# Patient Record
Sex: MALE | Race: WHITE | NOT HISPANIC OR LATINO | URBAN - METROPOLITAN AREA
[De-identification: names, ages, dates, MRNs, and addresses within clinical notes are randomized per-mention and may not be internally consistent; named-entity substitution may affect disease eponyms.]

---

## 2023-12-15 ENCOUNTER — PHARMACY VISIT (OUTPATIENT)
Dept: PHARMACY | Facility: CLINIC | Age: 63
End: 2023-12-15
Payer: COMMERCIAL

## 2023-12-15 PROCEDURE — RXMED WILLOW AMBULATORY MEDICATION CHARGE

## 2024-02-02 ENCOUNTER — TELEPHONE (OUTPATIENT)
Dept: PRIMARY CARE | Facility: CLINIC | Age: 64
End: 2024-02-02
Payer: COMMERCIAL

## 2024-02-02 DIAGNOSIS — Z12.5 SPECIAL SCREENING FOR MALIGNANT NEOPLASM OF PROSTATE: ICD-10-CM

## 2024-02-02 DIAGNOSIS — E03.9 HYPOTHYROIDISM, UNSPECIFIED TYPE: ICD-10-CM

## 2024-02-02 DIAGNOSIS — Z00.00 ROUTINE GENERAL MEDICAL EXAMINATION AT A HEALTH CARE FACILITY: ICD-10-CM

## 2024-02-05 PROBLEM — R09.81 NASAL CONGESTION: Status: RESOLVED | Noted: 2022-09-09 | Resolved: 2024-02-05

## 2024-02-05 PROBLEM — N52.9 ERECTILE DYSFUNCTION: Status: ACTIVE | Noted: 2023-04-12

## 2024-02-05 PROBLEM — R09.81 SINUS CONGESTION: Status: RESOLVED | Noted: 2023-03-08 | Resolved: 2024-02-05

## 2024-02-05 PROBLEM — R05.1 ACUTE COUGH: Status: RESOLVED | Noted: 2021-12-22 | Resolved: 2024-02-05

## 2024-02-05 PROBLEM — M25.561 KNEE PAIN, RIGHT: Status: RESOLVED | Noted: 2022-01-11 | Resolved: 2024-02-05

## 2024-02-05 PROBLEM — G47.30 SLEEP APNEA: Status: ACTIVE | Noted: 2019-03-22

## 2024-03-12 DIAGNOSIS — E03.9 HYPOTHYROIDISM, UNSPECIFIED TYPE: Primary | ICD-10-CM

## 2024-03-12 DIAGNOSIS — N52.9 VASCULOGENIC ERECTILE DYSFUNCTION, UNSPECIFIED VASCULOGENIC ERECTILE DYSFUNCTION TYPE: ICD-10-CM

## 2024-03-12 PROCEDURE — RXMED WILLOW AMBULATORY MEDICATION CHARGE

## 2024-03-12 RX ORDER — LEVOTHYROXINE SODIUM 75 UG/1
75 TABLET ORAL DAILY
Qty: 90 TABLET | Refills: 3 | Status: SHIPPED | OUTPATIENT
Start: 2024-03-12 | End: 2025-03-12

## 2024-03-12 RX ORDER — SILDENAFIL 100 MG/1
TABLET, FILM COATED ORAL
Qty: 30 TABLET | Refills: 5 | Status: SHIPPED | OUTPATIENT
Start: 2024-03-12 | End: 2025-03-12

## 2024-03-13 ENCOUNTER — PHARMACY VISIT (OUTPATIENT)
Dept: PHARMACY | Facility: CLINIC | Age: 64
End: 2024-03-13
Payer: COMMERCIAL

## 2024-03-13 PROCEDURE — RXMED WILLOW AMBULATORY MEDICATION CHARGE

## 2024-04-12 ENCOUNTER — LAB (OUTPATIENT)
Dept: LAB | Facility: LAB | Age: 64
End: 2024-04-12
Payer: COMMERCIAL

## 2024-04-12 DIAGNOSIS — Z12.5 SPECIAL SCREENING FOR MALIGNANT NEOPLASM OF PROSTATE: ICD-10-CM

## 2024-04-12 DIAGNOSIS — E03.9 HYPOTHYROIDISM, UNSPECIFIED TYPE: ICD-10-CM

## 2024-04-12 DIAGNOSIS — Z00.00 ROUTINE GENERAL MEDICAL EXAMINATION AT A HEALTH CARE FACILITY: ICD-10-CM

## 2024-04-12 LAB
ALBUMIN SERPL-MCNC: 4.5 G/DL (ref 3.5–5)
ALP BLD-CCNC: 56 U/L (ref 35–125)
ALT SERPL-CCNC: 11 U/L (ref 5–40)
ANION GAP SERPL CALC-SCNC: 10 MMOL/L
APPEARANCE UR: CLEAR
AST SERPL-CCNC: 21 U/L (ref 5–40)
BASOPHILS # BLD AUTO: 0.03 X10*3/UL (ref 0–0.1)
BASOPHILS NFR BLD AUTO: 0.6 %
BILIRUB SERPL-MCNC: 0.9 MG/DL (ref 0.1–1.2)
BILIRUB UR STRIP.AUTO-MCNC: NEGATIVE MG/DL
BUN SERPL-MCNC: 19 MG/DL (ref 8–25)
CALCIUM SERPL-MCNC: 9.5 MG/DL (ref 8.5–10.4)
CHLORIDE SERPL-SCNC: 102 MMOL/L (ref 97–107)
CHOLEST SERPL-MCNC: 242 MG/DL (ref 133–200)
CHOLEST/HDLC SERPL: 2.7 {RATIO}
CO2 SERPL-SCNC: 29 MMOL/L (ref 24–31)
COLOR UR: COLORLESS
CREAT SERPL-MCNC: 1.1 MG/DL (ref 0.4–1.6)
EGFRCR SERPLBLD CKD-EPI 2021: 75 ML/MIN/1.73M*2
EOSINOPHIL # BLD AUTO: 0.1 X10*3/UL (ref 0–0.7)
EOSINOPHIL NFR BLD AUTO: 1.9 %
ERYTHROCYTE [DISTWIDTH] IN BLOOD BY AUTOMATED COUNT: 12.6 % (ref 11.5–14.5)
EST. AVERAGE GLUCOSE BLD GHB EST-MCNC: 108 MG/DL
GLUCOSE SERPL-MCNC: 86 MG/DL (ref 65–99)
GLUCOSE UR STRIP.AUTO-MCNC: NORMAL MG/DL
HBA1C MFR BLD: 5.4 %
HCT VFR BLD AUTO: 44.9 % (ref 41–52)
HDLC SERPL-MCNC: 90 MG/DL
HGB BLD-MCNC: 14.7 G/DL (ref 13.5–17.5)
IMM GRANULOCYTES # BLD AUTO: 0.02 X10*3/UL (ref 0–0.7)
IMM GRANULOCYTES NFR BLD AUTO: 0.4 % (ref 0–0.9)
KETONES UR STRIP.AUTO-MCNC: NEGATIVE MG/DL
LDLC SERPL CALC-MCNC: 139 MG/DL (ref 65–130)
LEUKOCYTE ESTERASE UR QL STRIP.AUTO: NEGATIVE
LYMPHOCYTES # BLD AUTO: 1.29 X10*3/UL (ref 1.2–4.8)
LYMPHOCYTES NFR BLD AUTO: 24.4 %
MCH RBC QN AUTO: 29.6 PG (ref 26–34)
MCHC RBC AUTO-ENTMCNC: 32.7 G/DL (ref 32–36)
MCV RBC AUTO: 91 FL (ref 80–100)
MONOCYTES # BLD AUTO: 0.45 X10*3/UL (ref 0.1–1)
MONOCYTES NFR BLD AUTO: 8.5 %
NEUTROPHILS # BLD AUTO: 3.4 X10*3/UL (ref 1.2–7.7)
NEUTROPHILS NFR BLD AUTO: 64.2 %
NITRITE UR QL STRIP.AUTO: NEGATIVE
NRBC BLD-RTO: 0 /100 WBCS (ref 0–0)
PH UR STRIP.AUTO: 6.5 [PH]
PLATELET # BLD AUTO: 213 X10*3/UL (ref 150–450)
POTASSIUM SERPL-SCNC: 3.7 MMOL/L (ref 3.4–5.1)
PROT SERPL-MCNC: 6.5 G/DL (ref 5.9–7.9)
PROT UR STRIP.AUTO-MCNC: NEGATIVE MG/DL
PSA SERPL-MCNC: 0.6 NG/ML
RBC # BLD AUTO: 4.96 X10*6/UL (ref 4.5–5.9)
RBC # UR STRIP.AUTO: NEGATIVE /UL
SODIUM SERPL-SCNC: 141 MMOL/L (ref 133–145)
SP GR UR STRIP.AUTO: 1
T4 FREE SERPL-MCNC: 1.4 NG/DL (ref 0.9–1.7)
TRIGL SERPL-MCNC: 67 MG/DL (ref 40–150)
TSH SERPL DL<=0.05 MIU/L-ACNC: 7.1 MIU/L (ref 0.27–4.2)
UROBILINOGEN UR STRIP.AUTO-MCNC: NORMAL MG/DL
WBC # BLD AUTO: 5.3 X10*3/UL (ref 4.4–11.3)

## 2024-04-12 PROCEDURE — 83036 HEMOGLOBIN GLYCOSYLATED A1C: CPT

## 2024-04-12 PROCEDURE — 36415 COLL VENOUS BLD VENIPUNCTURE: CPT

## 2024-04-12 PROCEDURE — 84443 ASSAY THYROID STIM HORMONE: CPT

## 2024-04-12 PROCEDURE — 84153 ASSAY OF PSA TOTAL: CPT

## 2024-04-12 PROCEDURE — 84439 ASSAY OF FREE THYROXINE: CPT

## 2024-04-12 PROCEDURE — 80053 COMPREHEN METABOLIC PANEL: CPT

## 2024-04-12 PROCEDURE — 81003 URINALYSIS AUTO W/O SCOPE: CPT

## 2024-04-12 PROCEDURE — 80061 LIPID PANEL: CPT

## 2024-04-12 PROCEDURE — 85025 COMPLETE CBC W/AUTO DIFF WBC: CPT

## 2024-04-17 ENCOUNTER — OFFICE VISIT (OUTPATIENT)
Dept: PRIMARY CARE | Facility: CLINIC | Age: 64
End: 2024-04-17
Payer: COMMERCIAL

## 2024-04-17 VITALS
HEIGHT: 71 IN | OXYGEN SATURATION: 99 % | WEIGHT: 177 LBS | HEART RATE: 72 BPM | DIASTOLIC BLOOD PRESSURE: 78 MMHG | BODY MASS INDEX: 24.78 KG/M2 | SYSTOLIC BLOOD PRESSURE: 124 MMHG

## 2024-04-17 DIAGNOSIS — Z23 IMMUNIZATION DUE: Primary | ICD-10-CM

## 2024-04-17 DIAGNOSIS — E03.9 HYPOTHYROIDISM, UNSPECIFIED TYPE: ICD-10-CM

## 2024-04-17 DIAGNOSIS — Z00.00 ROUTINE GENERAL MEDICAL EXAMINATION AT A HEALTH CARE FACILITY: ICD-10-CM

## 2024-04-17 PROCEDURE — 99396 PREV VISIT EST AGE 40-64: CPT | Performed by: FAMILY MEDICINE

## 2024-04-17 PROCEDURE — 1036F TOBACCO NON-USER: CPT | Performed by: FAMILY MEDICINE

## 2024-04-17 PROCEDURE — 90715 TDAP VACCINE 7 YRS/> IM: CPT | Performed by: FAMILY MEDICINE

## 2024-04-17 PROCEDURE — 90471 IMMUNIZATION ADMIN: CPT | Performed by: FAMILY MEDICINE

## 2024-04-17 ASSESSMENT — PATIENT HEALTH QUESTIONNAIRE - PHQ9
1. LITTLE INTEREST OR PLEASURE IN DOING THINGS: NOT AT ALL
2. FEELING DOWN, DEPRESSED OR HOPELESS: NOT AT ALL
SUM OF ALL RESPONSES TO PHQ9 QUESTIONS 1 AND 2: 0

## 2024-04-17 ASSESSMENT — PAIN SCALES - GENERAL: PAINLEVEL: 0-NO PAIN

## 2024-04-17 NOTE — PROGRESS NOTES
"Subjective   Patient ID: Jose Elias Conner is a 63 y.o. male who presents for Annual Exam (EKG 4/12/2023/Colonoscopy  3/2019).    HPI   Here for CPE.  He is followed for hypothyroidism.  He is on levothyroxine 75 micrograms daily.  Sees Dr. JOSE Ross for rosacea.  He had been using metronidazole cream , however it has gone into remission and he has not needed medication for awhile.   He has erectile dysfunction .  He he is using sildenafil as needed.  Recent lab work finds an LDL mildly elevated above 130.  Review of the chart finds that patient has had this currently him in the past about 5 years ago.  It is not concerning at this time.  Patient had a tetanus shot in 2014.  He had his shingles immunizations in 2022.  He has not been immunized against pneumococcal disease yet.  He has been immunized against coronavirus x3.  He did receive his influenza immunization in the fall of 2023.   Patient had a colonoscopy in 2019 with a 10 year follow-up.   Patient has never used tobacco.  He has 1 glass of wine daily.  Review of Systems  General:  No fever chills weight loss or night sweats.  HEENT:  No sore throat no difficulty swallowing no earache no sinus pain no nasal drainage.  Pulmonary:  No cough sputum production shortness of breath or dyspnea on exertion.  Cardiac:  No chest pain palpitations shortness of breath syncope.  Gastrointestinal:  No abdominal bloating or abdominal pain.  No nausea vomiting.  No indigestion heartburn.  No melena or hematochezia.  No diarrhea.  Genitourinary no dysuria or hematuria.  Neuro:  No tingling numbness of the upper lower extremities.  No weakness of the upper lower extremities.  No headache.  Vision:  No blurred vision double vision or loss of vision.   Musculoskeletal:  No myalgia or arthralgia.  No joint swelling redness or warmth.  Psychiatric:  No moodiness depression or severe anxiety.  Objective   /78   Pulse 72   Ht 1.803 m (5' 11\")   Wt 80.3 kg (177 lb)   SpO2 " 99%   BMI 24.69 kg/m²     Physical Exam  General Appearance: NORMAN is in no acute distress. He is well nourished, well developed. The patient is awake and alert and appears stated age.  Head: Jacoby's hair pattern is normal for patients age and The scalp is normal . The skull is normocephalic, atraumatic. The face is unremarkable with no facial droop.  Eyes: PERRLA, EOMI, no scleral icterus.  Normal position and alignment. Eyebrows are normal. Both upper and lower lids have normal findings.  Ears, Nose, Mouth, Throat: EARS: External bilateral ears reveal normal helix, tragus and ear lobe.  Both canals are normal.  Tympanic membranes are pearly gray, normal landmarks, good light reflex.   MOUTH:   No oral cavity or dental complaints today.  He is wearing a mask.  Oral cavity is not inspected  Neck: Inspection reveals normal thyroid gland. Palpation shows normal thyroid gland. with No carotid bruit present.   Anterior cervical lymph node chains are unremarkable. Posterior chains are unremarkable.  Chest: Chest is symmetric. Lungs are clear to auscultation, no respiratory distress. Breathing is normal.  Cardiovascular: Heart is regular rate and rhythm DP pulses are present.  varicosities of both legs below the knees.  Abdomen: Abdomen is soft, NT, ND. BS + . No organomegaly or masses..   Genitourinary: Genital exam: .   Deferred  Lymph Nodes: Palpation of the cervical area are within normal limit.  Musculoskeletal: Gait is normal. Extremities: Full Range of motion and strength throughout. Cervical exam shows full range of motion and no tenderness .  Both feet have no evidence of lesions and nails are in good repair. Motor examination reveals normal tone and strength.  Skin: Skin has no bruising, rash, eruptions, or abnormal appearing lesions.  Neurological: Intact and non-focal. Cranial nerves II - XII are grossly intact. Motor exams reveals normal tone and strength ,  Psychiatric: Patient has appropriate judgement.  Patient has good insight. Proper orientation to person, place and time. Recent memory is intact. Remote memory is intact. Patient's mood is normal with good eye contact. Affect is appropriate.  Assessment/Plan   Problem List Items Addressed This Visit             ICD-10-CM    Hypothyroidism, unspecified   monitor.  Although TSH is mildly elevated at 7.3, free thyroxine is in the normal range.  Continue on levothyroxine 75 mcg daily. E03.9     Other Visit Diagnoses         Codes    Immunization due    -  Primary needs better control.  Patient received Tdap today.  Will consider Prevnar 20 next year. Z23    Relevant Orders    Tdap vaccine, age 7 years and older  (BOOSTRIX)    Routine general medical examination at a health care facility    normal exam.   Z00.00

## 2024-06-20 PROCEDURE — RXMED WILLOW AMBULATORY MEDICATION CHARGE

## 2024-06-21 ENCOUNTER — PHARMACY VISIT (OUTPATIENT)
Dept: PHARMACY | Facility: CLINIC | Age: 64
End: 2024-06-21
Payer: COMMERCIAL

## 2024-06-21 PROCEDURE — RXMED WILLOW AMBULATORY MEDICATION CHARGE

## 2024-07-31 ENCOUNTER — PHARMACY VISIT (OUTPATIENT)
Dept: PHARMACY | Facility: CLINIC | Age: 64
End: 2024-07-31
Payer: COMMERCIAL

## 2024-07-31 PROCEDURE — RXMED WILLOW AMBULATORY MEDICATION CHARGE

## 2024-07-31 RX ORDER — METRONIDAZOLE 7.5 MG/G
CREAM TOPICAL
Qty: 45 G | Refills: 0 | OUTPATIENT
Start: 2024-07-31

## 2024-07-31 RX ORDER — DOXYCYCLINE HYCLATE 50 MG/1
50 CAPSULE ORAL 2 TIMES DAILY
Qty: 84 CAPSULE | Refills: 0 | OUTPATIENT
Start: 2024-07-31

## 2024-08-02 ENCOUNTER — PHARMACY VISIT (OUTPATIENT)
Dept: PHARMACY | Facility: CLINIC | Age: 64
End: 2024-08-02
Payer: COMMERCIAL

## 2024-09-16 ENCOUNTER — CLINICAL SUPPORT (OUTPATIENT)
Dept: PRIMARY CARE | Facility: CLINIC | Age: 64
End: 2024-09-16
Payer: COMMERCIAL

## 2024-09-16 DIAGNOSIS — R09.81 NASAL CONGESTION: ICD-10-CM

## 2024-09-16 LAB
POC BINAX EXPIRATION: ABNORMAL
POC BINAX NOW COVID SERIAL NUMBER: ABNORMAL
POC SARS-COV-2 AG BINAX: ABNORMAL

## 2024-09-16 PROCEDURE — 87811 SARS-COV-2 COVID19 W/OPTIC: CPT | Performed by: FAMILY MEDICINE

## 2024-09-30 PROCEDURE — RXMED WILLOW AMBULATORY MEDICATION CHARGE

## 2024-10-02 ENCOUNTER — PHARMACY VISIT (OUTPATIENT)
Dept: PHARMACY | Facility: CLINIC | Age: 64
End: 2024-10-02
Payer: COMMERCIAL

## 2024-10-02 PROCEDURE — RXMED WILLOW AMBULATORY MEDICATION CHARGE

## 2024-12-27 ENCOUNTER — PHARMACY VISIT (OUTPATIENT)
Dept: PHARMACY | Facility: CLINIC | Age: 64
End: 2024-12-27
Payer: COMMERCIAL

## 2024-12-27 PROCEDURE — RXMED WILLOW AMBULATORY MEDICATION CHARGE

## 2025-01-06 ENCOUNTER — TELEPHONE (OUTPATIENT)
Dept: PRIMARY CARE | Facility: CLINIC | Age: 65
End: 2025-01-06
Payer: COMMERCIAL

## 2025-01-06 DIAGNOSIS — Z00.00 ROUTINE GENERAL MEDICAL EXAMINATION AT A HEALTH CARE FACILITY: ICD-10-CM

## 2025-01-06 DIAGNOSIS — Z12.5 SPECIAL SCREENING FOR MALIGNANT NEOPLASM OF PROSTATE: ICD-10-CM

## 2025-01-06 DIAGNOSIS — E03.9 HYPOTHYROIDISM, UNSPECIFIED TYPE: ICD-10-CM

## 2025-01-31 DIAGNOSIS — L01.00 IMPETIGO: Primary | ICD-10-CM

## 2025-01-31 DIAGNOSIS — M25.551 RIGHT HIP PAIN: ICD-10-CM

## 2025-01-31 PROCEDURE — RXMED WILLOW AMBULATORY MEDICATION CHARGE

## 2025-01-31 RX ORDER — MUPIROCIN 20 MG/G
OINTMENT TOPICAL 3 TIMES DAILY
Qty: 22 G | Refills: 1 | Status: SHIPPED | OUTPATIENT
Start: 2025-01-31 | End: 2025-02-11

## 2025-02-01 ENCOUNTER — PHARMACY VISIT (OUTPATIENT)
Dept: PHARMACY | Facility: CLINIC | Age: 65
End: 2025-02-01
Payer: COMMERCIAL

## 2025-02-07 ENCOUNTER — HOSPITAL ENCOUNTER (OUTPATIENT)
Dept: RADIOLOGY | Facility: CLINIC | Age: 65
Discharge: HOME | End: 2025-02-07
Payer: COMMERCIAL

## 2025-02-07 DIAGNOSIS — M25.551 RIGHT HIP PAIN: ICD-10-CM

## 2025-02-07 PROCEDURE — 73502 X-RAY EXAM HIP UNI 2-3 VIEWS: CPT | Mod: RT

## 2025-02-19 DIAGNOSIS — M25.551 RIGHT HIP PAIN: Primary | ICD-10-CM

## 2025-03-25 ENCOUNTER — PHARMACY VISIT (OUTPATIENT)
Dept: PHARMACY | Facility: CLINIC | Age: 65
End: 2025-03-25
Payer: COMMERCIAL

## 2025-03-25 DIAGNOSIS — M25.50 ARTHRALGIA, UNSPECIFIED JOINT: Primary | ICD-10-CM

## 2025-03-25 LAB
ALBUMIN SERPL-MCNC: 4 G/DL (ref 3.6–5.1)
ALP SERPL-CCNC: 59 U/L (ref 35–144)
ALT SERPL-CCNC: 11 U/L (ref 9–46)
ANION GAP SERPL CALCULATED.4IONS-SCNC: 7 MMOL/L (CALC) (ref 7–17)
AST SERPL-CCNC: 20 U/L (ref 10–35)
BILIRUB SERPL-MCNC: 0.7 MG/DL (ref 0.2–1.2)
BUN SERPL-MCNC: 12 MG/DL (ref 7–25)
CALCIUM SERPL-MCNC: 9.2 MG/DL (ref 8.6–10.3)
CHLORIDE SERPL-SCNC: 102 MMOL/L (ref 98–110)
CHOLEST SERPL-MCNC: 187 MG/DL
CHOLEST/HDLC SERPL: 2.4 (CALC)
CO2 SERPL-SCNC: 31 MMOL/L (ref 20–32)
CREAT SERPL-MCNC: 1 MG/DL (ref 0.7–1.35)
EGFRCR SERPLBLD CKD-EPI 2021: 84 ML/MIN/1.73M2
GLUCOSE SERPL-MCNC: 100 MG/DL (ref 65–99)
HDLC SERPL-MCNC: 79 MG/DL
LDLC SERPL CALC-MCNC: 93 MG/DL (CALC)
NONHDLC SERPL-MCNC: 108 MG/DL (CALC)
POTASSIUM SERPL-SCNC: 4.5 MMOL/L (ref 3.5–5.3)
PROT SERPL-MCNC: 6.9 G/DL (ref 6.1–8.1)
PSA SERPL-MCNC: 0.72 NG/ML
SODIUM SERPL-SCNC: 140 MMOL/L (ref 135–146)
T4 FREE SERPL-MCNC: 1.4 NG/DL (ref 0.8–1.8)
TRIGL SERPL-MCNC: 63 MG/DL
TSH SERPL-ACNC: 5.35 MIU/L (ref 0.4–4.5)

## 2025-03-25 PROCEDURE — RXMED WILLOW AMBULATORY MEDICATION CHARGE

## 2025-03-25 RX ORDER — PREDNISONE 20 MG/1
20 TABLET ORAL DAILY
Qty: 10 TABLET | Refills: 0 | Status: SHIPPED | OUTPATIENT
Start: 2025-03-25 | End: 2025-04-04

## 2025-03-26 LAB
BASOPHILS # BLD AUTO: 47 CELLS/UL (ref 0–200)
BASOPHILS NFR BLD AUTO: 0.6 %
EOSINOPHIL # BLD AUTO: 133 CELLS/UL (ref 15–500)
EOSINOPHIL NFR BLD AUTO: 1.7 %
ERYTHROCYTE [DISTWIDTH] IN BLOOD BY AUTOMATED COUNT: 12 % (ref 11–15)
ERYTHROCYTE [SEDIMENTATION RATE] IN BLOOD BY WESTERGREN METHOD: 11 MM/H
EST. AVERAGE GLUCOSE BLD GHB EST-MCNC: 114 MG/DL
EST. AVERAGE GLUCOSE BLD GHB EST-SCNC: 6.3 MMOL/L
HBA1C MFR BLD: 5.6 % OF TOTAL HGB
HCT VFR BLD AUTO: 41.1 % (ref 38.5–50)
HGB BLD-MCNC: 13.8 G/DL (ref 13.2–17.1)
LYMPHOCYTES # BLD AUTO: 1115 CELLS/UL (ref 850–3900)
LYMPHOCYTES NFR BLD AUTO: 14.3 %
MCH RBC QN AUTO: 29.7 PG (ref 27–33)
MCHC RBC AUTO-ENTMCNC: 33.6 G/DL (ref 32–36)
MCV RBC AUTO: 88.4 FL (ref 80–100)
MONOCYTES # BLD AUTO: 585 CELLS/UL (ref 200–950)
MONOCYTES NFR BLD AUTO: 7.5 %
NEUTROPHILS # BLD AUTO: 5920 CELLS/UL (ref 1500–7800)
NEUTROPHILS NFR BLD AUTO: 75.9 %
PLATELET # BLD AUTO: 281 THOUSAND/UL (ref 140–400)
PMV BLD REES-ECKER: 9.6 FL (ref 7.5–12.5)
RBC # BLD AUTO: 4.65 MILLION/UL (ref 4.2–5.8)
WBC # BLD AUTO: 7.8 THOUSAND/UL (ref 3.8–10.8)

## 2025-04-01 DIAGNOSIS — E03.9 HYPOTHYROIDISM, UNSPECIFIED TYPE: ICD-10-CM

## 2025-04-01 DIAGNOSIS — N52.9 VASCULOGENIC ERECTILE DYSFUNCTION, UNSPECIFIED VASCULOGENIC ERECTILE DYSFUNCTION TYPE: ICD-10-CM

## 2025-04-01 DIAGNOSIS — M35.3 POLYMYALGIA RHEUMATICA (MULTI): Primary | ICD-10-CM

## 2025-04-01 PROCEDURE — RXMED WILLOW AMBULATORY MEDICATION CHARGE

## 2025-04-01 RX ORDER — LEVOTHYROXINE SODIUM 75 UG/1
75 TABLET ORAL DAILY
Qty: 90 TABLET | Refills: 3 | Status: SHIPPED | OUTPATIENT
Start: 2025-04-01 | End: 2026-04-01

## 2025-04-01 RX ORDER — SILDENAFIL 100 MG/1
TABLET, FILM COATED ORAL
Qty: 30 TABLET | Refills: 5 | Status: SHIPPED | OUTPATIENT
Start: 2025-04-01 | End: 2026-04-01

## 2025-04-01 RX ORDER — PREDNISONE 10 MG/1
10 TABLET ORAL 2 TIMES DAILY
Qty: 60 TABLET | Refills: 1 | Status: SHIPPED | OUTPATIENT
Start: 2025-04-01 | End: 2025-09-28

## 2025-04-02 ENCOUNTER — PHARMACY VISIT (OUTPATIENT)
Dept: PHARMACY | Facility: CLINIC | Age: 65
End: 2025-04-02
Payer: COMMERCIAL

## 2025-04-02 PROCEDURE — RXMED WILLOW AMBULATORY MEDICATION CHARGE

## 2025-05-04 ASSESSMENT — PROMIS GLOBAL HEALTH SCALE
EMOTIONAL_PROBLEMS: NEVER
RATE_QUALITY_OF_LIFE: EXCELLENT
RATE_MENTAL_HEALTH: EXCELLENT
CARRYOUT_PHYSICAL_ACTIVITIES: COMPLETELY
RATE_AVERAGE_PAIN: 0
RATE_PHYSICAL_HEALTH: EXCELLENT
CARRYOUT_SOCIAL_ACTIVITIES: EXCELLENT
RATE_AVERAGE_FATIGUE: MILD
RATE_GENERAL_HEALTH: EXCELLENT
RATE_SOCIAL_SATISFACTION: EXCELLENT

## 2025-05-05 PROCEDURE — RXMED WILLOW AMBULATORY MEDICATION CHARGE

## 2025-05-07 ENCOUNTER — PHARMACY VISIT (OUTPATIENT)
Dept: PHARMACY | Facility: CLINIC | Age: 65
End: 2025-05-07
Payer: COMMERCIAL

## 2025-05-07 PROCEDURE — RXMED WILLOW AMBULATORY MEDICATION CHARGE

## 2025-05-09 ENCOUNTER — APPOINTMENT (OUTPATIENT)
Dept: PRIMARY CARE | Facility: CLINIC | Age: 65
End: 2025-05-09
Payer: COMMERCIAL

## 2025-05-09 VITALS
BODY MASS INDEX: 23.57 KG/M2 | HEIGHT: 72 IN | WEIGHT: 174 LBS | SYSTOLIC BLOOD PRESSURE: 122 MMHG | TEMPERATURE: 97.6 F | HEART RATE: 48 BPM | OXYGEN SATURATION: 99 % | DIASTOLIC BLOOD PRESSURE: 70 MMHG

## 2025-05-09 DIAGNOSIS — E03.9 HYPOTHYROIDISM, UNSPECIFIED TYPE: ICD-10-CM

## 2025-05-09 DIAGNOSIS — Z23 IMMUNIZATION DUE: ICD-10-CM

## 2025-05-09 DIAGNOSIS — M35.3 PMR (POLYMYALGIA RHEUMATICA) (MULTI): ICD-10-CM

## 2025-05-09 DIAGNOSIS — Z00.00 ROUTINE GENERAL MEDICAL EXAMINATION AT A HEALTH CARE FACILITY: Primary | ICD-10-CM

## 2025-05-09 PROCEDURE — 99397 PER PM REEVAL EST PAT 65+ YR: CPT | Performed by: FAMILY MEDICINE

## 2025-05-09 PROCEDURE — 1126F AMNT PAIN NOTED NONE PRSNT: CPT | Performed by: FAMILY MEDICINE

## 2025-05-09 PROCEDURE — 90471 IMMUNIZATION ADMIN: CPT | Performed by: FAMILY MEDICINE

## 2025-05-09 PROCEDURE — 1036F TOBACCO NON-USER: CPT | Performed by: FAMILY MEDICINE

## 2025-05-09 PROCEDURE — 90677 PCV20 VACCINE IM: CPT | Performed by: FAMILY MEDICINE

## 2025-05-09 PROCEDURE — 1159F MED LIST DOCD IN RCRD: CPT | Performed by: FAMILY MEDICINE

## 2025-05-09 PROCEDURE — 3008F BODY MASS INDEX DOCD: CPT | Performed by: FAMILY MEDICINE

## 2025-05-09 ASSESSMENT — ENCOUNTER SYMPTOMS
OCCASIONAL FEELINGS OF UNSTEADINESS: 0
DEPRESSION: 0
LOSS OF SENSATION IN FEET: 0

## 2025-05-09 ASSESSMENT — PATIENT HEALTH QUESTIONNAIRE - PHQ9
SUM OF ALL RESPONSES TO PHQ9 QUESTIONS 1 AND 2: 0
1. LITTLE INTEREST OR PLEASURE IN DOING THINGS: NOT AT ALL
2. FEELING DOWN, DEPRESSED OR HOPELESS: NOT AT ALL

## 2025-05-09 ASSESSMENT — PAIN SCALES - GENERAL: PAINLEVEL_OUTOF10: 0-NO PAIN

## 2025-05-09 NOTE — PROGRESS NOTES
Subjective   Patient ID: Jose Elias Conner is a 64 y.o. male who presents for Annual Exam (EKG  4/2023/Colonoscopy 3/2019 repeat 10/Tdap 4/2024/Zoster  3/2022  5/2022/Labs:  3/25/2025).    HPI   Here for CPE.  He is followed for hypothyroidism.  He is on levothyroxine 75 micrograms daily.  Sees Dr. JOSE Ross for rosacea.  He had been using metronidazole cream , however it has gone into remission and he has not needed medication for awhile.   About 2 months ago patient developed overnight proximal sleep limb weakness consistent with PMR.  He is currently on a very slow prednisone taper and has noticed that his symptoms are gone.  He is currently at about 12 mg daily.  He has erectile dysfunction .  He he is using sildenafil as needed.  Patient has history of hyperlipidemia with an elevated LDL from time to time.  He has changed his diet in the past year and his LDL is now less than 100.  He is not on medication.  Patient had a tetanus shot in 2024.  He had his shingles immunizations in 2022.  He has not been immunized against pneumococcal disease yet.  He has been immunized against coronavirus x4.  He did receive his influenza immunization in the fall of 2024.   Patient had a colonoscopy in 2019 with a 10 year follow-up.   Patient has never used tobacco.  He has 1 glass of wine daily.    Review of Systems  General:  No fever chills weight loss or night sweats.  HEENT:  No sore throat no difficulty swallowing no earache no sinus pain no nasal drainage.  Pulmonary:  No cough sputum production shortness of breath or dyspnea on exertion.  Cardiac:  No chest pain palpitations shortness of breath syncope.  Gastrointestinal:  No abdominal bloating or abdominal pain.  No nausea vomiting.  No indigestion heartburn.  No melena or hematochezia.  No diarrhea.  Genitourinary no dysuria or hematuria.  Neuro:  No tingling numbness of the upper lower extremities.  No weakness of the upper lower extremities.  No headache.  Vision:  No  "blurred vision double vision or loss of vision.   Musculoskeletal:  No myalgia or arthralgia.  No joint swelling redness or warmth.  Psychiatric:  No moodiness depression or severe anxiety.    Objective   /70 (BP Location: Left arm, Patient Position: Sitting)   Pulse (!) 48   Ht 1.816 m (5' 11.5\")   Wt 78.9 kg (174 lb)   SpO2 99%   BMI 23.93 kg/m²     Physical Exam  General Appearance: NORMAN is in no acute distress. He is well nourished, well developed. The patient is awake and alert and appears stated age.  Head: Jacoby's hair pattern is normal for patients age and The scalp is normal . The skull is normocephalic, atraumatic. The face is unremarkable with no facial droop.  Eyes: PERRLA, EOMI, no scleral icterus.  Normal position and alignment. Eyebrows are normal. Both upper and lower lids have normal findings.  Ears, Nose, Mouth, Throat: EARS: External bilateral ears reveal normal helix, tragus and ear lobe.  Both canals are normal.  Tympanic membranes are pearly gray, normal landmarks, good light reflex.   MOUTH:   No oral cavity or dental complaints today.  He is wearing a mask.  Oral cavity is not inspected  Neck: Inspection reveals normal thyroid gland. Palpation shows normal thyroid gland. with No carotid bruit present.   Anterior cervical lymph node chains are unremarkable. Posterior chains are unremarkable.  Chest: Chest is symmetric. Lungs are clear to auscultation, no respiratory distress. Breathing is normal.  Cardiovascular: Heart is regular rate and rhythm DP pulses are present.  varicosities of both legs below the knees.  Abdomen: Abdomen is soft, NT, ND. BS + . No organomegaly or masses..   Genitourinary: Genital exam: .   Deferred  Lymph Nodes: Palpation of the cervical area are within normal limit.  Musculoskeletal: Gait is normal. Extremities: Full Range of motion and strength throughout. Cervical exam shows full range of motion and no tenderness .  Both feet have no evidence of lesions " and nails are in good repair. Motor examination reveals normal tone and strength.  Skin: Skin has no bruising, rash, eruptions, or abnormal appearing lesions.  Neurological: Intact and non-focal. Cranial nerves II - XII are grossly intact. Motor exams reveals normal tone and strength ,  Psychiatric: Patient has appropriate judgement. Patient has good insight. Proper orientation to person, place and time. Recent memory is intact. Remote memory is intact. Patient's mood is normal with good eye contact. Affect is appropriate.    Assessment/Plan   Problem List Items Addressed This Visit           ICD-10-CM    Hypothyroidism, unspecified stable.  TSH is mildly elevated but free T4 therapeutic.  Therefore continue on current dose of levothyroxine. E03.9     Other Visit Diagnoses         Codes      Routine general medical examination at a health care facility    -  Primary normal exam. Z00.00      Immunization due    needs better control.  Patient received pneumococcal immunization today. Z23    Relevant Orders    Pneumococcal conjugate vaccine, 20-valent (PREVNAR 20)      PMR (polymyalgia rheumatica) (Multi)    in remission.  Continue on gradual prednisone taper. M35.3

## 2025-06-24 DIAGNOSIS — M35.3 PMR (POLYMYALGIA RHEUMATICA) (MULTI): Primary | ICD-10-CM

## 2025-06-24 PROCEDURE — RXMED WILLOW AMBULATORY MEDICATION CHARGE

## 2025-06-24 RX ORDER — PREDNISONE 5 MG/1
5 TABLET ORAL 2 TIMES DAILY
Qty: 60 TABLET | Refills: 1 | Status: SHIPPED | OUTPATIENT
Start: 2025-06-24 | End: 2025-08-23

## 2025-06-25 PROCEDURE — RXMED WILLOW AMBULATORY MEDICATION CHARGE

## 2025-06-27 ENCOUNTER — PHARMACY VISIT (OUTPATIENT)
Dept: PHARMACY | Facility: CLINIC | Age: 65
End: 2025-06-27
Payer: COMMERCIAL

## 2025-06-27 PROCEDURE — RXMED WILLOW AMBULATORY MEDICATION CHARGE

## 2025-07-03 ENCOUNTER — PHARMACY VISIT (OUTPATIENT)
Dept: PHARMACY | Facility: CLINIC | Age: 65
End: 2025-07-03
Payer: COMMERCIAL

## 2025-07-03 DIAGNOSIS — J40 BRONCHITIS: Primary | ICD-10-CM

## 2025-07-03 PROCEDURE — RXMED WILLOW AMBULATORY MEDICATION CHARGE

## 2025-07-03 RX ORDER — AZITHROMYCIN 250 MG/1
TABLET, FILM COATED ORAL
Qty: 6 TABLET | Refills: 0 | Status: SHIPPED | OUTPATIENT
Start: 2025-07-03 | End: 2025-07-08

## 2025-09-03 ENCOUNTER — PHARMACY VISIT (OUTPATIENT)
Dept: PHARMACY | Facility: CLINIC | Age: 65
End: 2025-09-03
Payer: COMMERCIAL

## 2025-09-03 DIAGNOSIS — M35.3 PMR (POLYMYALGIA RHEUMATICA) (MULTI): Primary | ICD-10-CM

## 2025-09-03 PROCEDURE — RXMED WILLOW AMBULATORY MEDICATION CHARGE

## 2025-09-03 RX ORDER — PREDNISONE 5 MG/1
10 TABLET ORAL DAILY
Qty: 60 TABLET | Refills: 1 | Status: SHIPPED | OUTPATIENT
Start: 2025-09-03 | End: 2025-11-02